# Patient Record
Sex: MALE | Race: WHITE | NOT HISPANIC OR LATINO | URBAN - METROPOLITAN AREA
[De-identification: names, ages, dates, MRNs, and addresses within clinical notes are randomized per-mention and may not be internally consistent; named-entity substitution may affect disease eponyms.]

---

## 2023-05-14 ENCOUNTER — EMERGENCY (EMERGENCY)
Facility: HOSPITAL | Age: 23
LOS: 1 days | Discharge: HOME | End: 2023-05-14
Attending: EMERGENCY MEDICINE
Payer: COMMERCIAL

## 2023-05-14 VITALS
TEMPERATURE: 98 F | RESPIRATION RATE: 20 BRPM | HEIGHT: 73 IN | SYSTOLIC BLOOD PRESSURE: 144 MMHG | DIASTOLIC BLOOD PRESSURE: 86 MMHG | HEART RATE: 117 BPM | OXYGEN SATURATION: 100 % | WEIGHT: 242.51 LBS

## 2023-05-14 VITALS
TEMPERATURE: 99 F | HEART RATE: 99 BPM | OXYGEN SATURATION: 99 % | RESPIRATION RATE: 18 BRPM | DIASTOLIC BLOOD PRESSURE: 84 MMHG | SYSTOLIC BLOOD PRESSURE: 137 MMHG

## 2023-05-14 PROCEDURE — 70110 X-RAY EXAM OF JAW 4/> VIEWS: CPT

## 2023-05-14 PROCEDURE — 70110 X-RAY EXAM OF JAW 4/> VIEWS: CPT | Mod: 26,76

## 2023-05-14 PROCEDURE — 21480 CLTX TMPRMAND DISLC 1ST/SBSQ: CPT | Mod: LT

## 2023-05-14 PROCEDURE — 99284 EMERGENCY DEPT VISIT MOD MDM: CPT | Mod: 25

## 2023-05-14 RX ORDER — FENTANYL CITRATE 50 UG/ML
50 INJECTION INTRAVENOUS ONCE
Refills: 0 | Status: DISCONTINUED | OUTPATIENT
Start: 2023-05-14 | End: 2023-05-14

## 2023-05-14 RX ORDER — MIDAZOLAM HYDROCHLORIDE 1 MG/ML
5 INJECTION, SOLUTION INTRAMUSCULAR; INTRAVENOUS ONCE
Refills: 0 | Status: DISCONTINUED | OUTPATIENT
Start: 2023-05-14 | End: 2023-05-14

## 2023-05-14 NOTE — ED PROVIDER NOTE - NSFOLLOWUPCLINICS_GEN_ALL_ED_FT
[facility] - ENT  Otolaryngology (ENT)  [address] [place] [address]  Phone: [phone]  Fax:   Follow Up Time: 7-10 Days

## 2023-05-14 NOTE — ED PROCEDURE NOTE - ATTENDING CONTRIBUTION TO CARE
I, EM Attending, Roman Bhatt was present for and supervised the entirety of the procedure performed by the Resident Physician or DONNA.

## 2023-05-14 NOTE — ED PROVIDER NOTE - CLINICAL SUMMARY MEDICAL DECISION MAKING FREE TEXT BOX
Britany Hdz MD, PGY-3: 23YO M No past medical history, presenting with jaw dislocation. vss, pe open jaw. suspect jaw dislocation, low suspicion facial fracture in absence of recent trauma. plan for xrays, if no fracture, reduction.

## 2023-05-14 NOTE — ED PROVIDER NOTE - PHYSICAL EXAMINATION
Gen: WDWN, NAD  HEENT: EOMI, no nasal discharge, mucous membranes moist, jaw open, inability to close, no ttp at superior edge of mandible   CV: RRR,  2+ radial pulses R  Resp:  no accessory muscle use, no increased work of breathing  MSK: No open wounds, no bruising, no LE edema  Neuro: A&Ox4, following commands, moving all four extremities spontaneously  Psych: appropriate mood

## 2023-05-14 NOTE — ED ADULT NURSE NOTE - OBJECTIVE STATEMENT
22y male A&OX4 coming in through triage complaining of jaw dislocation. No PMHX. Pt reports being at home and took a big yawn and his jaw locked. Pt states this has happen to him before. Pt states he is breathing find and pt doesn't look like he is in distress. Pt denies chest pain, shortness of breath, abd pain, N/V/D, fever, cough. Labs was drawn and sent to lab. Pt pending dispo.

## 2023-05-14 NOTE — ED PROVIDER NOTE - OBJECTIVE STATEMENT
21YO M No past medical history, presenting with jaw dislocation.  Endorses prior history of similar symptoms, although self reduced.  Endorses 1 hour prior to arrival in ED, jaw was dislocated when patient yawned.  Denies any other acute complaints, denies any recent facial trauma, falls.  Denies any other current complaints including fevers, cough, shortness of breath, chest pain, abdominal pain

## 2023-05-14 NOTE — ED PROVIDER NOTE - WR ORDER STATUS 2
Patient's first and last name, , procedure, and correct site confirmed prior to the start of procedure. Resulted

## 2023-05-14 NOTE — ED PROVIDER NOTE - PATIENT PORTAL LINK FT
You can access the FollowMyHealth Patient Portal offered by [facility] by registering at the following website: [email or web address]. By joining [employer]’s FollowMyHealth portal, you will also be able to view your health information using other applications (apps) compatible with our system.

## 2023-05-14 NOTE — ED PROVIDER NOTE - NSFOLLOWUPINSTRUCTIONS_ED_ALL_ED_FT
--given that you were in the ED today, we recommend a followup visit with your ENT within 7 days.   --your diagnosis is: jaw dislocation   --return to the ED if recurrent episode of jaw dislocation, if numbness/weakness to the jaw, if severe jaw pain.

## 2023-05-14 NOTE — ED ADULT NURSE NOTE - NSFALLUNIVINTERV_ED_ALL_ED
Bed/Stretcher in lowest position, wheels locked, appropriate side rails in place/Call bell, personal items and telephone in reach/Instruct patient to call for assistance before getting out of bed/chair/stretcher/Non-slip footwear applied when patient is off stretcher/[place] to call system/Physically safe environment - no spills, clutter or unnecessary equipment/Purposeful proactive rounding/Room/bathroom lighting operational, light cord in reach

## 2023-05-14 NOTE — ED PROVIDER NOTE - ATTENDING CONTRIBUTION TO CARE
Roman Bhatt MD:  I personally saw the patient and performed a substantive portion of the visit including all aspects of the medical decision making.    MDM: 22-year-old male with history of prior atraumatic mandibular dislocation status post self reduction, who presents with recurrent episode of mandibular dislocation that occurred approximately 1 hour prior to arrival when patient was yawning.  Denies any trauma or fall or injury.    On examination patient unable to close his mouth, but there is no bony tenderness or deformity.    Concern for mandibular/jaw dislocation, will obtain imaging.  X-ray showed anterior dislocation of mandible.    Bedside reduction performed, see procedure note.    My independent interpretation of the mandibular x-ray images shows reduction of prior mandibular dislocation.   More details to be seen in the official radiologist read.    On reassessment, patient with stable vitals, has resolved symptoms, able to bite down with normal bite alignment.  Patient given strict instructions to avoid full mouth opening including with yawning and with eating, to keep a soft diet to avoid recurrence.    Stable for discharge with close follow-up and strict return precautions. Discussed the indications and side-effects of applicable medications. The patient has been informed of all concerning signs and symptoms to return to Emergency Department, the necessity to follow up with PMD within 2-3 days and ENT in 1 week was explained, or to return to the ED if unable to follow-up appropriately, and the patient reports understanding of above with capacity and insight.    Differential includes but is not limited to: See above    Patient with new problems requiring additional work-up and treatment, following orders: see above  Obtained and reviewed external records: N/A  Additional history obtained from: Mother bedside  Chronic conditions and social determinants of health affecting care: See above